# Patient Record
Sex: FEMALE | Race: BLACK OR AFRICAN AMERICAN | URBAN - METROPOLITAN AREA
[De-identification: names, ages, dates, MRNs, and addresses within clinical notes are randomized per-mention and may not be internally consistent; named-entity substitution may affect disease eponyms.]

---

## 2022-03-09 ENCOUNTER — OFFICE VISIT (OUTPATIENT)
Dept: PRIMARY CARE CLINIC | Age: 37
End: 2022-03-09
Payer: COMMERCIAL

## 2022-03-09 VITALS
HEART RATE: 82 BPM | HEIGHT: 66 IN | OXYGEN SATURATION: 98 % | DIASTOLIC BLOOD PRESSURE: 76 MMHG | SYSTOLIC BLOOD PRESSURE: 114 MMHG | BODY MASS INDEX: 22.05 KG/M2 | TEMPERATURE: 97.4 F | WEIGHT: 137.2 LBS | RESPIRATION RATE: 20 BRPM

## 2022-03-09 DIAGNOSIS — F41.1 GAD (GENERALIZED ANXIETY DISORDER): Primary | ICD-10-CM

## 2022-03-09 DIAGNOSIS — Z28.21 REFUSED INFLUENZA VACCINE: ICD-10-CM

## 2022-03-09 DIAGNOSIS — R73.03 PREDIABETES: ICD-10-CM

## 2022-03-09 DIAGNOSIS — D18.01 HEMANGIOMA OF SKIN: ICD-10-CM

## 2022-03-09 PROCEDURE — 99203 OFFICE O/P NEW LOW 30 MIN: CPT | Performed by: FAMILY MEDICINE

## 2022-03-09 RX ORDER — ESCITALOPRAM OXALATE 10 MG/1
10 TABLET ORAL DAILY
Qty: 30 TABLET | Refills: 5 | Status: SHIPPED | OUTPATIENT
Start: 2022-03-09

## 2022-03-09 RX ORDER — HYDROXYZINE 25 MG/1
25 TABLET, FILM COATED ORAL
Qty: 30 TABLET | Refills: 0 | Status: SHIPPED | OUTPATIENT
Start: 2022-03-09 | End: 2022-03-19

## 2022-03-09 NOTE — PROGRESS NOTES
Visit Vitals  /76 (BP 1 Location: Left upper arm, BP Patient Position: Sitting, BP Cuff Size: Large adult)   Pulse 82   Temp 97.4 °F (36.3 °C) (Temporal)   Resp 20   Ht 5' 5.5\" (1.664 m)   Wt 137 lb 3.2 oz (62.2 kg)   SpO2 98%   BMI 22.48 kg/m²     Chief Complaint   Patient presents with   [address] area on chest, comes and goes.  Insomnia     Not sleeping during the night. 1. \"Have you been to the ER, urgent care clinic since your last visit? Hospitalized since your last visit? \" No    2. \"Have you seen or consulted any other health care providers outside of the [address] [place] [address] since your last visit? \" No     3. For patients aged 39-70: Has the patient had a colonoscopy / FIT/ Cologuard? NA - based on age      If the patient is female:    4. For patients aged 41-77: Has the patient had a mammogram within the past 2 years? NA - based on age or sex      11. For patients aged 21-65: Has the patient had a pap smear?  Yes - no Care Gap present 12/2020

## 2022-03-09 NOTE — PROGRESS NOTES
HPI     Chief Complaint:   Chief Complaint   Patient presents with   [facility] Establish Care     Red area on chest, comes and goes.  Insomnia     Not sleeping during the night. Abe Estrella is an 39 y.o. female. No PCP. Medical history significant for: history of anxiety and was on medication for situational HTN. Concerns for today's visit:  ROSAS: hx of anxiety. Not currently on anything for this but has been in the past. Anxiety has increased since moving here about a year ago with boyfriend. No family here. Tries to exercise. Has not made many meaningful connections. Anxiety interferes with sleep. Can fall asleep but wakes up often during the night and has a hard time going back to sleep. Dot on chest: intermittently notices a bright red bump on chest, usually in the same spot, comes and goes, not painful, no drainage. Has not had trauma occur there. Not sure how long it's been there but states it has been present for a while. Gyn Care  [id number]  Last pap Dec 2020 normal    Family History  No FH of breast cancer   No FH of colon cancer     Social History  Denies smoking. 4-5 shots at a time, sometimes a couple of times a week. No illicit drug use. Health Maintenance reviewed -  Declines flu shot. Declines COVID shot. Believes she is UTD with TDaP. Pap smear- need outside record. Current Outpatient Medications   Medication Sig    escitalopram oxalate (LEXAPRO) 10 mg tablet Take 1 Tablet by mouth daily.  hydrOXYzine HCL (ATARAX) 25 mg tablet Take 1 Tablet by mouth daily as needed for Anxiety or Sleep for up to 10 days. No current facility-administered medications for this visit.        Allergies - reviewed:   Not on File      Past Medical History - reviewed:  Past Medical History:   Diagnosis Date    ADHD     Anxiety     Constipation        Social History - reviewed:  Social History     Tobacco Use    Smoking status: Never Smoker    Smokeless tobacco: Never Used Vaping Use    Vaping Use: Never used   Substance Use Topics    Alcohol use: Yes     Comment: weekly    Drug use: Never       Past Surgical History - reviewed:  History reviewed. No pertinent surgical history. Family History - reviewed:  History reviewed. No pertinent family history. Immunizations - reviewed: There is no immunization history on file for this patient. Review of Systems   Review of Systems   Constitutional: Negative for chills and fever. Respiratory: Negative for cough and shortness of breath. Cardiovascular: Negative for chest pain and palpitations. Skin: Negative for itching and rash. Psychiatric/Behavioral: Negative for hallucinations, substance abuse and suicidal ideas. The patient is nervous/anxious and has insomnia. Reviewed PmHx, FmHx, SocHx as well as meds and allergies, updated and dated in the chart. Objective     Visit Vitals  /76 (BP 1 Location: Left upper arm, BP Patient Position: Sitting, BP Cuff Size: Large adult)   Pulse 82   Temp 97.4 °F (36.3 °C) (Temporal)   Resp 20   Ht 5' 5.5\" (1.664 m)   Wt 137 lb 3.2 oz (62.2 kg)   SpO2 98%   BMI 22.48 kg/m²       Physical Exam  Vitals and nursing note reviewed. Constitutional:       General: She is not in acute distress. Cardiovascular:      Rate and Rhythm: Normal rate and regular rhythm. Pulmonary:      Effort: Pulmonary effort is normal. No respiratory distress. Breath sounds: Normal breath sounds. Skin:     Comments: Punctate bright red superficial lesion without surrounding erythema. Non tender. Neurological:      Mental Status: She is alert. Psychiatric:      Comments: Pleasant, good eye contact. Appropriate conversation and thought content linear. Non pressured speech. Assessment and Plan     Diagnoses and all orders for this visit:    1.  ROSAS (generalized anxiety disorder)  Comments:  Starting lexapro, tolerated in the past. 1/2 T for 1st week followed by 1 tablet. Hydroxyzine prn sleep issues. Orders:  -     escitalopram oxalate (LEXAPRO) 10 mg tablet; Take 1 Tablet by mouth daily. -     hydrOXYzine HCL (ATARAX) 25 mg tablet; Take 1 Tablet by mouth daily as needed for Anxiety or Sleep for up to 10 days. 2. Hemangioma of skin  Comments:  Monitor. 3. Prediabetes  Comments: Follow on labs. Encourage healthy lifestyle and exercise. 4. Refused influenza vaccine         Follow-up and Dispositions    · Return in about 2 months (around 5/9/2022) for chronic follow up. I discussed the aforementioned diagnoses with the patient as well as the plan of care. All questions were answered and an AVS was provided.        Neris Laureano MD  [facility]  [id number], [place], [address] [place] [address]

## 2022-06-08 ENCOUNTER — TELEPHONE (OUTPATIENT)
Dept: PRIMARY CARE CLINIC | Age: 37
End: 2022-06-08

## 2022-06-08 NOTE — TELEPHONE ENCOUNTER
----- Message from Gisell Clements sent at 6/8/2022  7:07 AM EDT -----  Subject: Message to Provider    QUESTIONS  Information for Provider? Pt would like to know if she can get some names   of OBGYNs in the area. Please call pt  ---------------------------------------------------------------------------  --------------  CALL BACK INFO  What is the best way for the office to contact you? OK to leave message on   voicemail  Preferred Call Back Phone Number? [phone]  ---------------------------------------------------------------------------  --------------  SCRIPT ANSWERS  Relationship to Patient?  Self